# Patient Record
Sex: MALE | Race: OTHER | NOT HISPANIC OR LATINO | URBAN - METROPOLITAN AREA
[De-identification: names, ages, dates, MRNs, and addresses within clinical notes are randomized per-mention and may not be internally consistent; named-entity substitution may affect disease eponyms.]

---
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
## 2019-10-31 NOTE — ED PROVIDER NOTE - CPE EDP NEURO NORM
Patient ID: Kev is a 55 year old male.    Chief Complaint   Patient presents with   • Follow-up       HPI  Prabhjot returns in followup with his sister.  No new problems are reported.  He remains on LEV 1000mg BID and LTG 50mg BID.  No side effects of medications and no problems obtaining the medications.  He has had no convulsions.  Episodes that do occur happen about once per week.  Sister describes that when he has an episode he typically has a warning; he will sit down and have some shaking of a limb but seems to be awake.  She believes he is aware.  These are similar to events described in the past, but are now somewhat less frequent.    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)   • Intellectual disability       Family History   Problem Relation Age of Onset   • Dementia/Alzheimers Mother    • Heart disease Mother    • Hypertension Mother    • Diabetes Mother    • Myocardial Infarction Father        Social History     Socioeconomic History   • Marital status: Single     Spouse name: Not on file   • Number of children: Not on file   • Years of education: Not on file   • Highest education level: Not on file   Occupational History   • Not on file   Social Needs   • Financial resource strain: Not on file   • Food insecurity:     Worry: Not on file     Inability: Not on file   • Transportation needs:     Medical: Not on file     Non-medical: Not on file   Tobacco Use   • Smoking status: Never Smoker   • Smokeless tobacco: Never Used   Substance and Sexual Activity   • Alcohol use: Never     Frequency: Never   • Drug use: Never   • Sexual activity: Not on file   Lifestyle   • Physical activity:     Days per week: Not on file     Minutes per session: Not on file   • Stress: Not on file   Relationships   • Social connections:     Talks on phone: Not on file     Gets together: Not on file     Attends [employer] service: Not on file      Active member of club or organization: Not on file     Attends meetings of clubs or organizations: Not on file     Relationship status: Not on file   • Intimate partner violence:     Fear of current or ex partner: Not on file     Emotionally abused: Not on file     Physically abused: Not on file     Forced sexual activity: Not on file   Other Topics Concern   • Not on file   Social History Narrative   • Not on file       Current Outpatient Medications   Medication Sig Dispense Refill   • levetiracetam (KEPPRA) 1000 MG tablet Take 1,000 mg by mouth 2 times daily.     • lamoTRIgine (LAMICTAL) 25 MG tablet Take 2 tablets by mouth 2 times daily. 360 tablet 2   • atorvastatin (LIPITOR) 20 MG tablet      • lamoTRIgine (LAMICTAL) 25 MG tablet Take 2 tablets by mouth 2 times daily. 120 tablet 11   • levetiracetam (KEPPRA) 1000 MG tablet Take 1 tablet by mouth 2 times daily. 180 tablet 3   • lamoTRIgine (LAMICTAL) 25 MG tablet Take 2 tablets by mouth 2 times daily. 360 tablet 3     No current facility-administered medications for this visit.        ALLERGIES:  No Known Allergies    Review of Systems   All other systems reviewed and are negative.      Visit Vitals  /80 (BP Location: LUE - Left upper extremity, Patient Position: Sitting, Cuff Size: Regular)   Pulse 68   Ht 5' 7\" (1.702 m)   Wt 77.7 kg (171 lb 3.2 oz)   BMI 26.81 kg/m²       Physical Exam  General: awake, alert, breathing comfortably and in no apparent distress  Psych: normal affect, calm and cooperative, mildly intellectually disabled  Mental status: lucid, alert, conversant, answers simple questions and follows commands  Cranial nerves: pupils symmetrical and reactive, full extraocular movements, normal visual fields, no facial droop  Motor: normal gross motor strength in all 4 limbs with no lateralizing weakness  Coordination: no tremor appreciated, somewhat increased tone in the upper extremities  Gait: normal      Problem List Items Addressed This  Visit        Behavioral    Intellectual disability - Primary       Nervous    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  He still has what sound like minor complex partial seizures a few times per month but there is no LOC and the events last about a minute or so without loss of postural tone.  I reminded his sister that we could increase the dose of either of his medications; she feels he is doing well and does not want to be any more aggressive.  I told her to let me know if he's having any events associated with LOC.    Plan  LEV 1000mg BID  LTG 50mg BID (25mg tabs).  No levels will be drawn, they will presumably be low  F/u 6 months or as needed    Time Spent: 25 minutes  Greater than 50% of the time was spent in counseling and coordinating the patient's care.    Adam Munguia, DO   normal...

## 2020-11-26 NOTE — ED ADULT TRIAGE NOTE - NS ED NOTE AC HIGH RISK COUNTRIES
No
Partially impaired: cannot see medication labels or newsprint, but can see obstacles in path, and the surrounding layout; can count fingers at arm's length